# Patient Record
Sex: FEMALE
[De-identification: names, ages, dates, MRNs, and addresses within clinical notes are randomized per-mention and may not be internally consistent; named-entity substitution may affect disease eponyms.]

---

## 2021-06-16 PROBLEM — Z00.00 ENCOUNTER FOR PREVENTIVE HEALTH EXAMINATION: Status: ACTIVE | Noted: 2021-06-16

## 2021-06-17 ENCOUNTER — APPOINTMENT (OUTPATIENT)
Dept: ORTHOPEDIC SURGERY | Facility: CLINIC | Age: 31
End: 2021-06-17
Payer: COMMERCIAL

## 2021-06-17 ENCOUNTER — TRANSCRIPTION ENCOUNTER (OUTPATIENT)
Age: 31
End: 2021-06-17

## 2021-06-17 ENCOUNTER — NON-APPOINTMENT (OUTPATIENT)
Age: 31
End: 2021-06-17

## 2021-06-17 VITALS — HEIGHT: 63 IN | WEIGHT: 115 LBS | BODY MASS INDEX: 20.38 KG/M2

## 2021-06-17 DIAGNOSIS — Z87.442 PERSONAL HISTORY OF URINARY CALCULI: ICD-10-CM

## 2021-06-17 DIAGNOSIS — M25.561 PAIN IN RIGHT KNEE: ICD-10-CM

## 2021-06-17 DIAGNOSIS — M22.2X1 PATELLOFEMORAL DISORDERS, RIGHT KNEE: ICD-10-CM

## 2021-06-17 PROCEDURE — 99072 ADDL SUPL MATRL&STAF TM PHE: CPT

## 2021-06-17 PROCEDURE — 99204 OFFICE O/P NEW MOD 45 MIN: CPT

## 2021-06-17 PROCEDURE — 73564 X-RAY EXAM KNEE 4 OR MORE: CPT | Mod: RT

## 2021-06-17 RX ORDER — METHYLPREDNISOLONE 4 MG/1
4 TABLET ORAL
Qty: 21 | Refills: 0 | Status: ACTIVE | COMMUNITY
Start: 2021-06-17 | End: 1900-01-01

## 2021-06-17 RX ORDER — IBUPROFEN 600 MG/1
600 TABLET, FILM COATED ORAL
Qty: 60 | Refills: 0 | Status: ACTIVE | COMMUNITY
Start: 2021-06-17 | End: 1900-01-01

## 2021-06-17 NOTE — ADDENDUM
[FreeTextEntry1] : I, Julianne Mcdonough wrote this note acting as a scribe for Dr. Jose Alberto Caraballo on Jun 17, 2021.\par \par

## 2021-06-17 NOTE — HISTORY OF PRESENT ILLNESS
[de-identified] : KARLY DE LA ROSA is a 31 year female who presents for initial evaluation of the right knee. Patient began exercising a couple weeks ago. She increased her running activity as well as was doing yoga. About 4-5 days into the exercise regime, she noticed increased soreness and swelling in the knee. She has increased pain with ascending and descending stairs, as well as has trouble sleeping.She has been icing the affected joint as well as taking Tylenol. She takes 2 pills, twice a day.

## 2021-06-17 NOTE — DISCUSSION/SUMMARY
[de-identified] : The underlying pathophysiology was reviewed with the patient. XR films were reviewed with the patient. Discussed at length the nature of the patient’s condition. The right knee symptoms appear secondary to inflammation.\par \par I am not recommending a steroid injection today as I'd like to observe the patient's symptoms to see if they subside with more conservative treatment. If the symptoms persist and/or worsen, a cortisone injection might be indicated in the future.\par \par Right knee wrapped with an ACE bandage.\par Advised patient to ice affected joint as well as rest.\par Encouraged patient to wear a knee brace.\par The patient wishes to proceed with physical therapy of the right knee.  A script was given.		 \par Rx: Ibuprofen 600mg, BID 30 tablets. Advised to take Ibuprofen for 1 week.\par Rx: Medrolpak\par Topical analgesics as needed.\par \par Patient can continue activities as tolerated. All questions answered, understanding verbalized. Patient in agreement with plan of care.

## 2021-06-17 NOTE — END OF VISIT
[FreeTextEntry3] : All medical record entries made by the Scribe were at my,  Dr. Jose Alberto Caraballo MD., direction and personally dictated by me on 06/17/2021. I have personally reviewed the chart and agree that the record accurately reflects my personal performance of the history, physical exam, assessment and plan.\par \par

## 2021-06-17 NOTE — PHYSICAL EXAM
[de-identified] : Patient is WDWN, alert, and in no acute distress. Breathing is unlabored. She is grossly oriented to person, place, \par and time.\par \par Right Knee:\par There is medial/lateral joint line tenderness to palpation. Tenderness over patella.  Edema is present.\par Range of motion: Active flexion and extension are full and painful\par Tests and Signs: All tests for stability are normal. \par Strength: flexion and extension 5/5  [de-identified] : AP, lateral, skyline, and tunnel views of the right knee were obtained and revealed no abnormalities. No acute fracture. No dislocation. Cartilage spaces are maintained.

## 2021-06-24 ENCOUNTER — APPOINTMENT (OUTPATIENT)
Dept: ORTHOPEDIC SURGERY | Facility: CLINIC | Age: 31
End: 2021-06-24

## 2021-08-12 ENCOUNTER — TRANSCRIPTION ENCOUNTER (OUTPATIENT)
Age: 31
End: 2021-08-12

## 2021-10-10 ENCOUNTER — TRANSCRIPTION ENCOUNTER (OUTPATIENT)
Age: 31
End: 2021-10-10

## 2021-12-03 ENCOUNTER — TRANSCRIPTION ENCOUNTER (OUTPATIENT)
Age: 31
End: 2021-12-03

## 2022-01-06 ENCOUNTER — TRANSCRIPTION ENCOUNTER (OUTPATIENT)
Age: 32
End: 2022-01-06

## 2022-04-07 ENCOUNTER — TRANSCRIPTION ENCOUNTER (OUTPATIENT)
Age: 32
End: 2022-04-07

## 2024-02-14 ENCOUNTER — NON-APPOINTMENT (OUTPATIENT)
Age: 34
End: 2024-02-14

## 2024-02-14 DIAGNOSIS — Z98.890 OTHER SPECIFIED POSTPROCEDURAL STATES: ICD-10-CM

## 2024-02-14 RX ORDER — PREDNISONE 20 MG/1
20 TABLET ORAL DAILY
Refills: 0 | Status: ACTIVE | COMMUNITY

## 2024-02-14 RX ORDER — DOXYCYCLINE HYCLATE 100 MG/1
100 CAPSULE ORAL DAILY
Refills: 0 | Status: ACTIVE | COMMUNITY

## 2024-02-14 RX ORDER — AZITHROMYCIN DIHYDRATE 250 MG/1
250 TABLET, FILM COATED ORAL
Refills: 0 | Status: ACTIVE | COMMUNITY

## 2024-04-24 ENCOUNTER — OFFICE (OUTPATIENT)
Dept: URBAN - METROPOLITAN AREA CLINIC 35 | Facility: CLINIC | Age: 34
Setting detail: OPHTHALMOLOGY
End: 2024-04-24
Payer: COMMERCIAL

## 2024-04-24 DIAGNOSIS — H52.13: ICD-10-CM

## 2024-04-24 DIAGNOSIS — H16.423: ICD-10-CM

## 2024-04-24 DIAGNOSIS — H40.013: ICD-10-CM

## 2024-04-24 DIAGNOSIS — H11.153: ICD-10-CM

## 2024-04-24 PROCEDURE — 92004 COMPRE OPH EXAM NEW PT 1/>: CPT | Performed by: OPHTHALMOLOGY

## 2024-04-24 PROCEDURE — 92015 DETERMINE REFRACTIVE STATE: CPT | Performed by: OPHTHALMOLOGY
